# Patient Record
Sex: FEMALE | Race: WHITE | NOT HISPANIC OR LATINO | URBAN - METROPOLITAN AREA
[De-identification: names, ages, dates, MRNs, and addresses within clinical notes are randomized per-mention and may not be internally consistent; named-entity substitution may affect disease eponyms.]

---

## 2018-11-28 ENCOUNTER — OFFICE (OUTPATIENT)
Dept: URBAN - METROPOLITAN AREA CLINIC 101 | Facility: CLINIC | Age: 83
End: 2018-11-28

## 2018-11-28 VITALS
DIASTOLIC BLOOD PRESSURE: 68 MMHG | SYSTOLIC BLOOD PRESSURE: 101 MMHG | HEART RATE: 79 BPM | TEMPERATURE: 97.9 F | HEIGHT: 65 IN | WEIGHT: 121 LBS

## 2018-11-28 DIAGNOSIS — R11.0 NAUSEA: ICD-10-CM

## 2018-11-28 DIAGNOSIS — Z79.1 LONG TERM (CURRENT) USE OF NON-STEROIDAL ANTI-INFLAMMATORIES: ICD-10-CM

## 2018-11-28 DIAGNOSIS — R63.4 ABNORMAL WEIGHT LOSS: ICD-10-CM

## 2018-11-28 DIAGNOSIS — K21.9 GASTRO-ESOPHAGEAL REFLUX DISEASE WITHOUT ESOPHAGITIS: ICD-10-CM

## 2018-11-28 PROCEDURE — 99215 OFFICE O/P EST HI 40 MIN: CPT

## 2018-11-28 RX ORDER — PANTOPRAZOLE SODIUM 40 MG/1
TABLET, DELAYED RELEASE ORAL
Qty: 90 | Refills: 3 | Status: COMPLETED
Start: 2018-11-28 | End: 2021-05-25

## 2018-11-28 RX ORDER — ONDANSETRON HYDROCHLORIDE 8 MG/1
TABLET, FILM COATED ORAL
Qty: 45 | Refills: 1 | Status: COMPLETED
Start: 2018-11-28 | End: 2021-05-25

## 2018-11-28 NOTE — SERVICEHPINOTES
ANNETTE BUSTAMANTE   is a   84  female who complains of nausea and weight loss. She recently started going to a different orthopedic surgeon as she does have chronic back issues. Was given prednisone taper around 10/31 due to arthritis. She states she has been nauseous since. On 11/3 she was started on meloxicam which also made nausea worse. She then took tramadol for some time which helped her back pain somewhat. She was taking aleve in the past, usually BID. She saw pain management, got steroid injections but had nausea afterwards so rec she see us for further eval. She reports epigastric pain that feels like a band across her abdomen. Last week she went to the ER and brings ER records in with her--normal CBC, lipase/amylase--CMP elevated glucose, low potassium, normal LFTs. She continues with constant nausea has vomited 3x. No hematemesis. She is finding it hard to eat due to the nausea which is why she has lost weight (approx. 10 lbs in 1 month) she was not given an rx for any anti-nausea medications. She has been taking omeprazole 20mg daily for years now which usually helps--has been breaking open capsule and putting it in applesauce as she finds the large capsule hard to swallow w/o gagging recently. No issues with her smaller pills. No fever or chills. No melena or blood in the stool. She had some constipation when taking tramadol but this has since resolved. Has also taken antacids, Pepcid prn which seems to help. No dysphagia.BREGD 9/2016 mild reflux changes, neg h pylori, barrettsBRColonoscopy 2/2013, no polypsBRCT scan 3/2016--liver cysts, otherwise unremarkable.

## 2021-05-25 ENCOUNTER — OFFICE (OUTPATIENT)
Dept: URBAN - METROPOLITAN AREA CLINIC 102 | Facility: CLINIC | Age: 86
End: 2021-05-25
Payer: COMMERCIAL

## 2021-05-25 VITALS
TEMPERATURE: 97.4 F | SYSTOLIC BLOOD PRESSURE: 110 MMHG | HEIGHT: 65 IN | HEART RATE: 89 BPM | WEIGHT: 120 LBS | DIASTOLIC BLOOD PRESSURE: 80 MMHG

## 2021-05-25 DIAGNOSIS — R13.10 DYSPHAGIA, UNSPECIFIED: ICD-10-CM

## 2021-05-25 DIAGNOSIS — R63.4 ABNORMAL WEIGHT LOSS: ICD-10-CM

## 2021-05-25 DIAGNOSIS — R19.8 OTHER SPECIFIED SYMPTOMS AND SIGNS INVOLVING THE DIGESTIVE S: ICD-10-CM

## 2021-05-25 DIAGNOSIS — K21.9 GASTRO-ESOPHAGEAL REFLUX DISEASE WITHOUT ESOPHAGITIS: ICD-10-CM

## 2021-05-25 PROCEDURE — 99214 OFFICE O/P EST MOD 30 MIN: CPT | Performed by: PHYSICIAN ASSISTANT

## 2021-05-25 NOTE — SERVICEHPINOTES
ANNETTE BUSTAMANTE   is a   86 yo white female who complains of dysphagia, voice hoarseness, and globus sensation since 04/2021. She reports difficulty swallowing solid foods and pills, so will break up capsules/tablets to pass down her medications. Last EGD in 2016. She was initially seen by PCP who began evaluation for these concerns, which led to treatment for sinusitis with prednisone taper. Given no resolution of symptoms, she was then seen by ENT Dr Robbins who did an in-office laryngoscopy that did not reveal any abnormality to explain for her symptoms. She notes long h/o GERD that is managed on Pantoprazole 40 mg qd used 30 min prior to breakfast, which she has taken since 11/2018. Last seen in 2018 with weight of 121 lbs and current weight at 120 lbs. Denies chest pain, odynophagia, cough, abdominal pain, change in BMs, melena, significant weight loss. No other complaints. BR

## 2021-05-26 ENCOUNTER — ON CAMPUS - OUTPATIENT (OUTPATIENT)
Dept: URBAN - METROPOLITAN AREA HOSPITAL 37 | Facility: HOSPITAL | Age: 86
End: 2021-05-26
Payer: COMMERCIAL

## 2021-05-26 DIAGNOSIS — R13.10 DYSPHAGIA, UNSPECIFIED: ICD-10-CM

## 2021-05-26 DIAGNOSIS — K21.9 GASTRO-ESOPHAGEAL REFLUX DISEASE WITHOUT ESOPHAGITIS: ICD-10-CM

## 2021-05-26 PROCEDURE — 43249 ESOPH EGD DILATION <30 MM: CPT | Performed by: INTERNAL MEDICINE

## 2021-05-26 PROCEDURE — 43239 EGD BIOPSY SINGLE/MULTIPLE: CPT | Mod: 59 | Performed by: INTERNAL MEDICINE

## 2021-07-14 ENCOUNTER — OFFICE (OUTPATIENT)
Dept: URBAN - METROPOLITAN AREA CLINIC 102 | Facility: CLINIC | Age: 86
End: 2021-07-14
Payer: COMMERCIAL

## 2021-07-14 VITALS
TEMPERATURE: 97.5 F | DIASTOLIC BLOOD PRESSURE: 82 MMHG | HEART RATE: 81 BPM | SYSTOLIC BLOOD PRESSURE: 109 MMHG | HEIGHT: 65 IN | WEIGHT: 120 LBS

## 2021-07-14 DIAGNOSIS — R19.8 OTHER SPECIFIED SYMPTOMS AND SIGNS INVOLVING THE DIGESTIVE S: ICD-10-CM

## 2021-07-14 DIAGNOSIS — R13.10 DYSPHAGIA, UNSPECIFIED: ICD-10-CM

## 2021-07-14 DIAGNOSIS — K21.9 GASTRO-ESOPHAGEAL REFLUX DISEASE WITHOUT ESOPHAGITIS: ICD-10-CM

## 2021-07-14 DIAGNOSIS — R11.0 NAUSEA: ICD-10-CM

## 2021-07-14 PROCEDURE — 99214 OFFICE O/P EST MOD 30 MIN: CPT

## 2021-07-14 NOTE — SERVICEHPINOTES
ANNETTE BUSTAMANTE   is a   87  female who complains of nausea. She underwent EGD 5/26/21 for dysphagia which showed mild non-erosive esophagitis but no rossi's or other abnormality.  Empiric dilation was done. She is feeling much better today. She reports symptoms began mid-may, ear ache, headache, some hoarseness, loss of appetite, fatigue, etc. Thought it was a cold but symptoms continued. BRWeight is stable (120) compared to visit on 5/25. She states she has lost 4-5 lbs per her scale.BRHer dysphagia symptoms seemed to improve. States several hours after eating will get nauseous, eating crackers seem to settle her stomach. Globus sensation has improved, however. Had burning sensation in upper abdomen. Tried clairitin x 2 weeks which did help. No dysphagia. BRTakes pantoprazole 40mg qAM, tried taking BID x 3-4 days which did not help much. Has not tried H2B recently.BRLabs in June from PCP: normal LFTs, lipase amylase 111 ().BR